# Patient Record
Sex: FEMALE | Race: BLACK OR AFRICAN AMERICAN | NOT HISPANIC OR LATINO | Employment: UNEMPLOYED | URBAN - METROPOLITAN AREA
[De-identification: names, ages, dates, MRNs, and addresses within clinical notes are randomized per-mention and may not be internally consistent; named-entity substitution may affect disease eponyms.]

---

## 2019-05-08 ENCOUNTER — TELEPHONE (OUTPATIENT)
Dept: SURGERY | Facility: CLINIC | Age: 24
End: 2019-05-08

## 2019-05-08 NOTE — TELEPHONE ENCOUNTER
Informed pt that Dr. Garcia  Does not do breast reductions,Instructed pt to reach out to Plastic surgery

## 2019-05-08 NOTE — TELEPHONE ENCOUNTER
----- Message from Coby Burton sent at 5/8/2019 11:28 AM CDT -----  Contact: pt  Needs Advice    Reason for call: Pt states her doctor gave her a referral for Dr. Garcia to have  A breast reduction. I am not aware of Dr. Garcia performing these type of surgeries but the pt was positive that is who her doctor wanted her to see. The pt states she will be faxing over her referral through [facility].     Communication Preference: [phone]     Additional Information: